# Patient Record
Sex: FEMALE | Race: WHITE
[De-identification: names, ages, dates, MRNs, and addresses within clinical notes are randomized per-mention and may not be internally consistent; named-entity substitution may affect disease eponyms.]

---

## 2019-07-05 ENCOUNTER — HOSPITAL ENCOUNTER (EMERGENCY)
Dept: HOSPITAL 8 - ED | Age: 34
Discharge: LEFT BEFORE DISCHARGE | End: 2019-07-05
Payer: SELF-PAY

## 2019-07-05 DIAGNOSIS — Z53.21: ICD-10-CM

## 2019-07-05 DIAGNOSIS — R10.9: Primary | ICD-10-CM

## 2020-05-14 ENCOUNTER — HOSPITAL ENCOUNTER (EMERGENCY)
Dept: HOSPITAL 8 - ED | Age: 35
Discharge: HOME | End: 2020-05-14
Payer: COMMERCIAL

## 2020-05-14 VITALS — SYSTOLIC BLOOD PRESSURE: 154 MMHG | DIASTOLIC BLOOD PRESSURE: 78 MMHG

## 2020-05-14 VITALS — HEIGHT: 65 IN | BODY MASS INDEX: 43.53 KG/M2 | WEIGHT: 261.25 LBS

## 2020-05-14 DIAGNOSIS — M54.5: Primary | ICD-10-CM

## 2020-05-14 DIAGNOSIS — E03.9: ICD-10-CM

## 2020-05-14 PROCEDURE — 99284 EMERGENCY DEPT VISIT MOD MDM: CPT

## 2020-05-14 PROCEDURE — 96372 THER/PROPH/DIAG INJ SC/IM: CPT

## 2020-08-01 ENCOUNTER — HOSPITAL ENCOUNTER (EMERGENCY)
Dept: HOSPITAL 8 - ED | Age: 35
LOS: 1 days | Discharge: HOME | End: 2020-08-02
Payer: COMMERCIAL

## 2020-08-01 VITALS — SYSTOLIC BLOOD PRESSURE: 134 MMHG | DIASTOLIC BLOOD PRESSURE: 88 MMHG

## 2020-08-01 VITALS — BODY MASS INDEX: 39.78 KG/M2 | WEIGHT: 233.03 LBS | HEIGHT: 64 IN

## 2020-08-01 DIAGNOSIS — R11.2: ICD-10-CM

## 2020-08-01 DIAGNOSIS — G89.18: ICD-10-CM

## 2020-08-01 DIAGNOSIS — E66.9: ICD-10-CM

## 2020-08-01 DIAGNOSIS — R10.12: Primary | ICD-10-CM

## 2020-08-01 LAB
ALBUMIN SERPL-MCNC: 3.2 G/DL (ref 3.4–5)
ALP SERPL-CCNC: 58 U/L (ref 45–117)
ALT SERPL-CCNC: 27 U/L (ref 12–78)
ANION GAP SERPL CALC-SCNC: 7 MMOL/L (ref 5–15)
BASOPHILS # BLD AUTO: 0.07 X10^3/UL (ref 0–0.1)
BASOPHILS NFR BLD AUTO: 1 % (ref 0–1)
BILIRUB SERPL-MCNC: 0.2 MG/DL (ref 0.2–1)
CALCIUM SERPL-MCNC: 8.5 MG/DL (ref 8.5–10.1)
CHLORIDE SERPL-SCNC: 112 MMOL/L (ref 98–107)
CREAT SERPL-MCNC: 0.71 MG/DL (ref 0.55–1.02)
EOSINOPHIL # BLD AUTO: 0.29 X10^3/UL (ref 0–0.4)
EOSINOPHIL NFR BLD AUTO: 4 % (ref 1–7)
ERYTHROCYTE [DISTWIDTH] IN BLOOD BY AUTOMATED COUNT: 15.9 % (ref 9.6–15.2)
LYMPHOCYTES # BLD AUTO: 3.45 X10^3/UL (ref 1–3.4)
LYMPHOCYTES NFR BLD AUTO: 46 % (ref 22–44)
MCH RBC QN AUTO: 27.7 PG (ref 27–34.8)
MCHC RBC AUTO-ENTMCNC: 32.6 G/DL (ref 32.4–35.8)
MCV RBC AUTO: 85.1 FL (ref 80–100)
MD: NO
MICROSCOPIC: (no result)
MONOCYTES # BLD AUTO: 0.63 X10^3/UL (ref 0.2–0.8)
MONOCYTES NFR BLD AUTO: 8 % (ref 2–9)
NEUTROPHILS # BLD AUTO: 3.1 X10^3/UL (ref 1.8–6.8)
NEUTROPHILS NFR BLD AUTO: 41 % (ref 42–75)
PLATELET # BLD AUTO: 353 X10^3/UL (ref 130–400)
PMV BLD AUTO: 8.5 FL (ref 7.4–10.4)
PROT SERPL-MCNC: 7.3 G/DL (ref 6.4–8.2)
RBC # BLD AUTO: 4.64 X10^6/UL (ref 3.82–5.3)

## 2020-08-01 PROCEDURE — 96375 TX/PRO/DX INJ NEW DRUG ADDON: CPT

## 2020-08-01 PROCEDURE — 84703 CHORIONIC GONADOTROPIN ASSAY: CPT

## 2020-08-01 PROCEDURE — 96374 THER/PROPH/DIAG INJ IV PUSH: CPT

## 2020-08-01 PROCEDURE — 83690 ASSAY OF LIPASE: CPT

## 2020-08-01 PROCEDURE — 36415 COLL VENOUS BLD VENIPUNCTURE: CPT

## 2020-08-01 PROCEDURE — 96361 HYDRATE IV INFUSION ADD-ON: CPT

## 2020-08-01 PROCEDURE — 74177 CT ABD & PELVIS W/CONTRAST: CPT

## 2020-08-01 PROCEDURE — 81003 URINALYSIS AUTO W/O SCOPE: CPT

## 2020-08-01 PROCEDURE — 99285 EMERGENCY DEPT VISIT HI MDM: CPT

## 2020-08-01 PROCEDURE — 85025 COMPLETE CBC W/AUTO DIFF WBC: CPT

## 2020-08-01 PROCEDURE — 80053 COMPREHEN METABOLIC PANEL: CPT

## 2020-08-01 NOTE — NUR
THIS IS A 35Y F THAT COMES IN FOR L SIDED ABD PAIN FOLLWING GASTRIC PROCEEDURE 
JULY 2020. PT STS THE PAIN HAS BEEN WORSENING FOR PAST FEW DAYS. NOW RADIATES 
TO BACK. PT EDUCATED ON NEED FOR URINE SAMPLE STS "I DON'T REALLY PEE." PT 
ASKED ABOUT LAST BM PT STS "I DON'T REALLY DO THAT EITHER JUST LIKE MAYBE ONCE 
A WEEK OR LESS." PT CONNECTED TO MONITORING VSS NADN